# Patient Record
Sex: FEMALE | Race: WHITE
[De-identification: names, ages, dates, MRNs, and addresses within clinical notes are randomized per-mention and may not be internally consistent; named-entity substitution may affect disease eponyms.]

---

## 2020-10-19 ENCOUNTER — FORM ENCOUNTER (OUTPATIENT)
Age: 60
End: 2020-10-19

## 2021-03-08 PROBLEM — Z00.00 ENCOUNTER FOR PREVENTIVE HEALTH EXAMINATION: Status: ACTIVE | Noted: 2021-03-08

## 2021-09-23 ENCOUNTER — NON-APPOINTMENT (OUTPATIENT)
Age: 61
End: 2021-09-23

## 2021-10-20 DIAGNOSIS — Z87.19 PERSONAL HISTORY OF OTHER DISEASES OF THE DIGESTIVE SYSTEM: ICD-10-CM

## 2021-10-20 DIAGNOSIS — Z86.39 PERSONAL HISTORY OF OTHER ENDOCRINE, NUTRITIONAL AND METABOLIC DISEASE: ICD-10-CM

## 2021-10-20 DIAGNOSIS — Z80.3 FAMILY HISTORY OF MALIGNANT NEOPLASM OF BREAST: ICD-10-CM

## 2021-10-20 DIAGNOSIS — Z87.891 PERSONAL HISTORY OF NICOTINE DEPENDENCE: ICD-10-CM

## 2021-10-20 DIAGNOSIS — R92.2 INCONCLUSIVE MAMMOGRAM: ICD-10-CM

## 2021-10-20 DIAGNOSIS — Z72.89 OTHER PROBLEMS RELATED TO LIFESTYLE: ICD-10-CM

## 2021-10-20 DIAGNOSIS — Z86.79 PERSONAL HISTORY OF OTHER DISEASES OF THE CIRCULATORY SYSTEM: ICD-10-CM

## 2021-10-25 NOTE — PAST MEDICAL HISTORY
[Postmenopausal] : The patient is postmenopausal [Menarche Age ____] : age at menarche was [unfilled] [Menopause Age____] : age at menopause was [unfilled] [History of Hormone Replacement Treatment] : has no history of hormone replacement treatment [Total Preg ___] : G[unfilled] [Live Births ___] : P[unfilled]

## 2021-10-25 NOTE — ASSESSMENT
[FreeTextEntry1] : The patient is a 60-year-old nulliparous postmenopausal white female.  She underwent menarche at age 11.  She underwent menopause at age 44 and never took any hormone replacement therapy.  She has a family history with her mother who had breast cancer at age 40.  She underwent a benign right breast biopsy 1997.  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  There was some surrounding DCIS.  She then underwent a formal partial mastectomy on December 23, 2004 showing some residual DCIS intermediate nuclear grade but free margins and she had 11 negative lymph nodes.  The cancer was ER/CA positive and HER-2/chung negative.  This was a pathologic prognostic stage IA breast cancer.  She underwent chemotherapy with Dr. Anaya and had radiation therapy at [facility].  She was on tamoxifen and then Arimidex for a total of 5 years.  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  She underwent her last bilateral mammography and ultrasound on September 21, 2021 at [facility] which is showed some chronic scarring changes but no suspicious masses.  On exam today, .... The patient was reassured and should continue yearly follow-up in October 2022.  Her next bilateral mammography and ultrasound will be due in September 2022 and she was given prescriptions.

## 2021-10-25 NOTE — REASON FOR VISIT
[Follow-Up: _____] : a [unfilled] follow-up visit [FreeTextEntry1] : The patient comes in with a strong family history of breast cancer and a personal history of undergoing a left breast partial mastectomy and axillary lymph node dissection in 2004 for 1.9 cm low-grade invasive duct cancer with surrounding DCIS and 11 negative nodes.  The cancer was ER/CO positive and HER-2/chung negative making this a pathologic prognostic stage IA breast cancer.  She underwent chemotherapy with Dr. Anaya and had radiation therapy at [facility] and took tamoxifen and Arimidex for a total of 5 years.  She comes in for routine yearly follow-up and continues to get yearly mammography and ultrasound.

## 2021-10-25 NOTE — HISTORY OF PRESENT ILLNESS
[FreeTextEntry1] : The patient is a 60-year-old nulliparous postmenopausal white female.  She underwent menarche at age 11.  She underwent menopause at age 44 and never took any hormone replacement therapy.  She has a family history with her mother who had breast cancer at age 40.  She underwent a benign right breast biopsy 1997.  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  There was some surrounding DCIS.  She then underwent a formal partial mastectomy on December 23, 2004 showing some residual DCIS intermediate nuclear grade but free margins and she had 11 negative lymph nodes.  The cancer was ER/CA positive and HER-2/chung negative.  This was a pathologic prognostic stage IA breast cancer.  She underwent chemotherapy with Dr. Anaya and had radiation therapy at [facility].  She was on tamoxifen and then Arimidex for a total of 5 years.  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  She comes in for routine yearly follow-up and continues to get yearly mammography and ultrasound.

## 2021-10-26 ENCOUNTER — APPOINTMENT (OUTPATIENT)
Dept: BREAST CENTER | Facility: CLINIC | Age: 61
End: 2021-10-26
Payer: COMMERCIAL

## 2022-01-04 NOTE — HISTORY OF PRESENT ILLNESS
[FreeTextEntry1] : The patient is a 60-year-old nulliparous postmenopausal white female.  She underwent menarche at age 11.  She underwent menopause at age 44 and never took any hormone replacement therapy.  She has a family history with her mother who had breast cancer at age 40.  She underwent a benign right breast biopsy 1997.  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  There was some surrounding DCIS.  She then underwent a formal partial mastectomy on December 23, 2004 showing some residual DCIS intermediate nuclear grade but free margins and she had 11 negative lymph nodes.  The cancer was ER/NC positive and HER-2/chung negative.  This was a pathologic prognostic stage IA breast cancer.  She underwent chemotherapy with Dr. Anaya and had radiation therapy at [facility].  She was on tamoxifen and then Arimidex for a total of 5 years.  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  She comes in for routine yearly follow-up and continues to get yearly mammography and ultrasound.

## 2022-01-04 NOTE — ASSESSMENT
[FreeTextEntry1] : The patient is a 60-year-old nulliparous postmenopausal white female.  She underwent menarche at age 11.  She underwent menopause at age 44 and never took any hormone replacement therapy.  She has a family history with her mother who had breast cancer at age 40.  She underwent a benign right breast biopsy 1997.  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  There was some surrounding DCIS.  She then underwent a formal partial mastectomy on December 23, 2004 showing some residual DCIS intermediate nuclear grade but free margins and she had 11 negative lymph nodes.  The cancer was ER/OR positive and HER-2/chung negative.  This was a pathologic prognostic stage IA breast cancer.  She underwent chemotherapy with Dr. Anaya and had radiation therapy at [facility].  She was on tamoxifen and then Arimidex for a total of 5 years.  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  She underwent her last bilateral mammography and ultrasound on September 21, 2021 at [facility] which showed some chronic scarring changes but no suspicious masses.  On exam today, .... The patient was reassured and should continue yearly follow-up in January 2023.  Her next bilateral mammography and ultrasound will be due in September 2022 and she was given prescriptions.

## 2022-01-04 NOTE — REASON FOR VISIT
[Follow-Up: _____] : a [unfilled] follow-up visit [FreeTextEntry1] : The patient comes in with a strong family history of breast cancer and a personal history of undergoing a left breast partial mastectomy and axillary lymph node dissection in 2004 for 1.9 cm low-grade invasive duct cancer with surrounding DCIS and 11 negative nodes.  The cancer was ER/[place] positive and HER-2/chung negative making this a pathologic prognostic stage IA breast cancer.  She underwent chemotherapy with Dr. Anaya and had radiation therapy at [facility] and took tamoxifen and Arimidex for a total of 5 years.  She comes in for routine yearly follow-up and continues to get yearly mammography and ultrasound.

## 2022-01-07 ENCOUNTER — APPOINTMENT (OUTPATIENT)
Dept: BREAST CENTER | Facility: CLINIC | Age: 62
End: 2022-01-07
Payer: COMMERCIAL

## 2022-01-26 ENCOUNTER — TRANSCRIPTION ENCOUNTER (OUTPATIENT)
Age: 62
End: 2022-01-26

## 2022-01-26 ENCOUNTER — APPOINTMENT (OUTPATIENT)
Dept: BREAST CENTER | Facility: CLINIC | Age: 62
End: 2022-01-26
Payer: COMMERCIAL

## 2022-01-26 VITALS
WEIGHT: 113 LBS | HEIGHT: 62 IN | SYSTOLIC BLOOD PRESSURE: 161 MMHG | OXYGEN SATURATION: 98 % | BODY MASS INDEX: 20.8 KG/M2 | HEART RATE: 80 BPM | DIASTOLIC BLOOD PRESSURE: 89 MMHG

## 2022-01-26 DIAGNOSIS — Z85.3 PERSONAL HISTORY OF MALIGNANT NEOPLASM OF BREAST: ICD-10-CM

## 2022-01-26 PROCEDURE — 99213 OFFICE O/P EST LOW 20 MIN: CPT

## 2022-01-26 NOTE — PHYSICAL EXAM
[Normocephalic] : normocephalic [Atraumatic] : atraumatic [EOMI] : extra ocular movement intact [Supple] : supple [No Supraclavicular Adenopathy] : no supraclavicular adenopathy [No Cervical Adenopathy] : no cervical adenopathy [Examined in the supine and seated position] : examined in the supine and seated position [No dominant masses] : no dominant masses in right breast  [No dominant masses] : no dominant masses left breast [No Nipple Retraction] : no left nipple retraction [No Nipple Discharge] : no left nipple discharge [Breast Mass Right Breast ___cm] : no masses [Breast Mass Left Breast ___cm] : no masses [Breast Nipple Inversion] : nipples not inverted [Breast Nipple Retraction] : nipples not retracted [Breast Nipple Flattening] : nipples not flattened [Breast Nipple Fissures] : nipples not fissured [Breast Abnormal Lactation (Galactorrhea)] : no galactorrhea [Breast Abnormal Secretion Bloody Fluid] : no bloody discharge [Breast Abnormal Secretion Serous Fluid] : no serous discharge [Breast Abnormal Secretion Opalescent Fluid] : no milky discharge [No Axillary Lymphadenopathy] : no left axillary lymphadenopathy [No Edema] : no edema [No Rashes] : no rashes [No Ulceration] : no ulceration [de-identified] : On exam, the patient has mildly ptotic B-cup breasts.  On palpation, she has typical scarring changes in the upper outer aspect of the left breast but no suspicious findings.  She has no axillary, supraclavicular, or cervical adenopathy. [de-identified] : Status post partial mastectomy with no evidence of recurrence

## 2022-01-26 NOTE — HISTORY OF PRESENT ILLNESS
[FreeTextEntry1] : The patient is a 61-year-old nulliparous postmenopausal white female.  She underwent menarche at age 11.  She underwent menopause at age 44 and never took any hormone replacement therapy.  She has a family history with her mother who had breast cancer at age 40.  She underwent a benign right breast biopsy 1997.  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  There was some surrounding DCIS.  She then underwent a formal partial mastectomy on December 23, 2004 showing some residual DCIS intermediate nuclear grade but free margins and she had 11 negative lymph nodes.  The cancer was ER/MT positive and HER-2/chung negative.  This was a pathologic prognostic stage IA breast cancer.  She underwent chemotherapy with Dr. Anaya and had radiation therapy at [facility].  She was on tamoxifen and then Arimidex for a total of 5 years.  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  She comes in for routine yearly follow-up and continues to get yearly mammography and ultrasound.

## 2022-01-26 NOTE — ASSESSMENT
[FreeTextEntry1] : The patient is a 61-year-old nulliparous postmenopausal white female.  She underwent menarche at age 11.  She underwent menopause at age 44 and never took any hormone replacement therapy.  She has a family history with her mother who had breast cancer at age 40.  She underwent a benign right breast biopsy 1997.  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  There was some surrounding DCIS.  She then underwent a formal partial mastectomy on December 23, 2004 showing some residual DCIS intermediate nuclear grade but free margins and she had 11 negative lymph nodes.  The cancer was ER/[place] positive and HER-2/chung negative.  This was a pathologic prognostic stage IA breast cancer.  She underwent chemotherapy with Dr. Anaya and had radiation therapy at [facility].  She was on tamoxifen and then Arimidex for a total of 5 years.  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  She underwent her last bilateral mammography and ultrasound on September 21, 2021 at [facility] which showed some chronic scarring changes but no suspicious masses.  On exam today, she has no evidence of recurrence in the left breast and no suspicious findings in the right breast.  The patient was reassured and should continue yearly follow-up in January 2023.  Her next bilateral mammography and ultrasound will be due in September 2022 and she was given prescriptions.

## 2022-01-26 NOTE — REASON FOR VISIT
[Follow-Up: _____] : a [unfilled] follow-up visit [FreeTextEntry1] : The patient comes in with a strong family history of breast cancer and a personal history of undergoing a left breast partial mastectomy and axillary lymph node dissection in 2004 for 1.9 cm low-grade invasive duct cancer with surrounding DCIS and 11 negative nodes.  The cancer was ER/[place] positive and HER-2/chung negative making this a pathologic prognostic stage IA breast cancer.  She underwent chemotherapy with Dr. Anaya and had radiation therapy at [facility] and took tamoxifen and Arimidex for a total of 5 years.  She comes in for routine yearly follow-up and continues to get yearly mammography and ultrasound.

## 2023-12-26 ENCOUNTER — NON-APPOINTMENT (OUTPATIENT)
Age: 63
End: 2023-12-26

## 2024-01-22 DIAGNOSIS — R92.8 OTHER ABNORMAL AND INCONCLUSIVE FINDINGS ON DIAGNOSTIC IMAGING OF BREAST: ICD-10-CM

## 2024-01-23 ENCOUNTER — APPOINTMENT (OUTPATIENT)
Dept: BREAST CENTER | Facility: CLINIC | Age: 64
End: 2024-01-23
Payer: MEDICARE

## 2024-01-23 VITALS
HEART RATE: 76 BPM | SYSTOLIC BLOOD PRESSURE: 119 MMHG | HEIGHT: 62 IN | WEIGHT: 107 LBS | BODY MASS INDEX: 19.69 KG/M2 | OXYGEN SATURATION: 99 % | DIASTOLIC BLOOD PRESSURE: 78 MMHG

## 2024-01-23 DIAGNOSIS — Z12.31 ENCOUNTER FOR SCREENING MAMMOGRAM FOR MALIGNANT NEOPLASM OF BREAST: ICD-10-CM

## 2024-01-23 DIAGNOSIS — Z85.3 PERSONAL HISTORY OF MALIGNANT NEOPLASM OF BREAST: ICD-10-CM

## 2024-01-23 DIAGNOSIS — Z80.3 FAMILY HISTORY OF MALIGNANT NEOPLASM OF BREAST: ICD-10-CM

## 2024-01-23 DIAGNOSIS — Z90.12 ACQUIRED ABSENCE OF LEFT BREAST AND NIPPLE: ICD-10-CM

## 2024-01-23 PROCEDURE — 99213 OFFICE O/P EST LOW 20 MIN: CPT

## 2024-01-23 NOTE — PHYSICAL EXAM
[Normocephalic] : normocephalic [Atraumatic] : atraumatic [EOMI] : extra ocular movement intact [Supple] : supple [No Supraclavicular Adenopathy] : no supraclavicular adenopathy [No Cervical Adenopathy] : no cervical adenopathy [Examined in the supine and seated position] : examined in the supine and seated position [No dominant masses] : no dominant masses in right breast  [No dominant masses] : no dominant masses left breast [No Nipple Retraction] : no left nipple retraction [No Nipple Discharge] : no left nipple discharge [Breast Mass Right Breast ___cm] : no masses [Breast Mass Left Breast ___cm] : no masses [No Axillary Lymphadenopathy] : no left axillary lymphadenopathy [No Edema] : no edema [No Rashes] : no rashes [No Ulceration] : no ulceration [Breast Nipple Inversion] : nipples not inverted [Breast Nipple Retraction] : nipples not retracted [Breast Nipple Flattening] : nipples not flattened [Breast Nipple Fissures] : nipples not fissured [Breast Abnormal Lactation (Galactorrhea)] : no galactorrhea [Breast Abnormal Secretion Bloody Fluid] : no bloody discharge [Breast Abnormal Secretion Serous Fluid] : no serous discharge [Breast Abnormal Secretion Opalescent Fluid] : no milky discharge [de-identified] : On exam, the patient has mildly ptotic B-cup breasts.  On palpation, she has typical scarring changes in the upper outer aspect of the left breast but no suspicious findings.  She has no axillary, supraclavicular, or cervical adenopathy. [de-identified] : Status post partial mastectomy with no evidence of recurrence

## 2024-01-23 NOTE — ASSESSMENT
[FreeTextEntry1] : The patient is a 63-year-old nulliparous postmenopausal white female. She underwent menarche at age 11. She underwent menopause at age 44 and never took any hormone replacement therapy. She has a family history with her mother who had breast cancer at age 40. She underwent a benign right breast biopsy 1997. 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 There was some surrounding DCIS. She then underwent a formal partial mastectomy on December 23, 2004 showing some residual DCIS intermediate nuclear grade but free margins and she had 11 negative lymph nodes. The cancer was ER/[place] positive and HER-2/chung negative. This was a pathologic prognostic stage IA breast cancer. She underwent chemotherapy with Dr. Anaya and had radiation therapy at [facility]. She was on tamoxifen and then Arimidex for a total of 5 years. 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 She underwent her last bilateral mammography and ultrasound on December 7, 2023 at [facility] which showed some suspicious indeterminate calcifications within the posterior right breast upper outer quadrant and stereotactic core biopsy was performed on December 20, 2023 which showed fibrocystic change with associated benign calcifications. On exam today, she has no evidence of recurrence in the left breast and no suspicious findings in the right breast. The patient was reassured and should continue yearly follow-up in January 2025.  I would like her to get a follow-up diagnostic right breast mammography in 6 months around June 2024 and she was given a prescription and her next bilateral mammography and ultrasound will be due in December 2024 and she was given prescriptions.

## 2025-01-04 ENCOUNTER — NON-APPOINTMENT (OUTPATIENT)
Age: 65
End: 2025-01-04

## 2025-03-06 ENCOUNTER — APPOINTMENT (OUTPATIENT)
Dept: BREAST CENTER | Facility: CLINIC | Age: 65
End: 2025-03-06
Payer: MEDICARE

## 2025-03-06 DIAGNOSIS — Z80.3 FAMILY HISTORY OF MALIGNANT NEOPLASM OF BREAST: ICD-10-CM

## 2025-03-06 DIAGNOSIS — Z90.12 ACQUIRED ABSENCE OF LEFT BREAST AND NIPPLE: ICD-10-CM

## 2025-03-06 DIAGNOSIS — Z85.3 PERSONAL HISTORY OF MALIGNANT NEOPLASM OF BREAST: ICD-10-CM

## 2025-03-06 DIAGNOSIS — Z12.31 ENCOUNTER FOR SCREENING MAMMOGRAM FOR MALIGNANT NEOPLASM OF BREAST: ICD-10-CM

## 2025-03-06 PROCEDURE — 99213 OFFICE O/P EST LOW 20 MIN: CPT
